# Patient Record
Sex: MALE | Race: WHITE | NOT HISPANIC OR LATINO | Employment: FULL TIME | URBAN - METROPOLITAN AREA
[De-identification: names, ages, dates, MRNs, and addresses within clinical notes are randomized per-mention and may not be internally consistent; named-entity substitution may affect disease eponyms.]

---

## 2021-10-07 ENCOUNTER — TELEPHONE (OUTPATIENT)
Dept: GASTROENTEROLOGY | Facility: CLINIC | Age: 56
End: 2021-10-07

## 2021-10-19 ENCOUNTER — TELEPHONE (OUTPATIENT)
Dept: GASTROENTEROLOGY | Facility: CLINIC | Age: 56
End: 2021-10-19

## 2021-10-20 ENCOUNTER — TELEPHONE (OUTPATIENT)
Dept: GASTROENTEROLOGY | Facility: CLINIC | Age: 56
End: 2021-10-20

## 2021-10-22 ENCOUNTER — TELEPHONE (OUTPATIENT)
Dept: GASTROENTEROLOGY | Facility: CLINIC | Age: 56
End: 2021-10-22

## 2021-11-30 PROCEDURE — 99358 PROLONG SERVICE W/O CONTACT: CPT | Mod: S$GLB,,, | Performed by: INTERNAL MEDICINE

## 2021-11-30 PROCEDURE — 99358 PR PROLONGED SERV,NO CONTACT,1ST HR: ICD-10-PCS | Mod: S$GLB,,, | Performed by: INTERNAL MEDICINE

## 2021-12-01 ENCOUNTER — OFFICE VISIT (OUTPATIENT)
Dept: GASTROENTEROLOGY | Facility: CLINIC | Age: 56
End: 2021-12-01
Payer: COMMERCIAL

## 2021-12-01 VITALS
WEIGHT: 185.88 LBS | HEART RATE: 91 BPM | DIASTOLIC BLOOD PRESSURE: 74 MMHG | OXYGEN SATURATION: 98 % | TEMPERATURE: 97 F | SYSTOLIC BLOOD PRESSURE: 111 MMHG | BODY MASS INDEX: 25.92 KG/M2

## 2021-12-01 DIAGNOSIS — Z79.891 CHRONIC PRESCRIPTION OPIATE USE: ICD-10-CM

## 2021-12-01 DIAGNOSIS — K50.80 CROHN'S DISEASE OF BOTH SMALL AND LARGE INTESTINE WITHOUT COMPLICATION: ICD-10-CM

## 2021-12-01 PROCEDURE — 99205 OFFICE O/P NEW HI 60 MIN: CPT | Mod: S$GLB,,, | Performed by: INTERNAL MEDICINE

## 2021-12-01 PROCEDURE — 99205 PR OFFICE/OUTPT VISIT, NEW, LEVL V, 60-74 MIN: ICD-10-PCS | Mod: S$GLB,,, | Performed by: INTERNAL MEDICINE

## 2021-12-01 RX ORDER — INSULIN DETEMIR 100 [IU]/ML
INJECTION, SOLUTION SUBCUTANEOUS
COMMUNITY
Start: 2021-08-24

## 2021-12-01 RX ORDER — CEPHALEXIN 500 MG/1
500 CAPSULE ORAL 3 TIMES DAILY
COMMUNITY
Start: 2021-11-23 | End: 2022-06-22

## 2021-12-01 RX ORDER — DOXYCYCLINE HYCLATE 100 MG
100 TABLET ORAL 2 TIMES DAILY
COMMUNITY
Start: 2021-11-22 | End: 2022-06-22

## 2021-12-01 RX ORDER — EMPAGLIFLOZIN 25 MG/1
25 TABLET, FILM COATED ORAL DAILY
COMMUNITY
Start: 2021-11-12

## 2021-12-01 RX ORDER — PREDNISONE 20 MG/1
20 TABLET ORAL DAILY
COMMUNITY
Start: 2021-09-21 | End: 2022-06-22

## 2021-12-01 RX ORDER — METFORMIN HYDROCHLORIDE 1000 MG/1
1000 TABLET ORAL 2 TIMES DAILY
COMMUNITY
Start: 2021-10-23

## 2021-12-01 RX ORDER — USTEKINUMAB 90 MG/ML
INJECTION, SOLUTION SUBCUTANEOUS
COMMUNITY
Start: 2021-11-05

## 2021-12-01 RX ORDER — INSULIN LISPRO 100 [IU]/ML
INJECTION, SOLUTION INTRAVENOUS; SUBCUTANEOUS
COMMUNITY
Start: 2021-06-11

## 2021-12-01 RX ORDER — ZOLPIDEM TARTRATE 5 MG/1
5 TABLET ORAL NIGHTLY PRN
COMMUNITY
Start: 2021-08-25 | End: 2022-06-22

## 2021-12-01 RX ORDER — SIMVASTATIN 20 MG/1
20 TABLET, FILM COATED ORAL
COMMUNITY
End: 2021-12-01

## 2021-12-06 ENCOUNTER — TELEPHONE (OUTPATIENT)
Dept: GASTROENTEROLOGY | Facility: CLINIC | Age: 56
End: 2021-12-06
Payer: COMMERCIAL

## 2022-03-10 ENCOUNTER — TELEPHONE (OUTPATIENT)
Dept: GASTROENTEROLOGY | Facility: CLINIC | Age: 57
End: 2022-03-10
Payer: COMMERCIAL

## 2022-03-10 NOTE — TELEPHONE ENCOUNTER
----- Message from Gonzalo Pascual sent at 3/10/2022 11:22 AM CST -----  Contact: @[phone]   Patient returning a missed call from  deepika Chambers return call

## 2022-03-10 NOTE — TELEPHONE ENCOUNTER
----- Message from Radha Anand sent at 3/8/2022  1:35 PM CST -----  Regarding: Call back  Contact: [phone]  Type:  Patient Call Back    Who Called:pt  What this is regarding?:was results received   Would the patient rather a call back or a response via [facility]? Call back  Best Call Back Number:[phone]  Additional Information:     Advised to call back directly if there are further questions, or if these symptoms fail to improve as anticipated or worsen.

## 2022-03-18 ENCOUNTER — TELEPHONE (OUTPATIENT)
Dept: GASTROENTEROLOGY | Facility: CLINIC | Age: 57
End: 2022-03-18
Payer: COMMERCIAL

## 2022-03-18 NOTE — TELEPHONE ENCOUNTER
Spoke with patient  Calling to see if we got the stool calpro results from Dr. Lovell  Informed we did not and we confirmed the fax number as [phone]  He is going to request they send them again    He wasn't sure what to do about his appointment on 4/1/22 with Dr. Sawyer and I advised we should wait until we get the stool calpro results before deciding.  Pt verbalized understanding to all and has no further questions at this time.

## 2022-03-18 NOTE — TELEPHONE ENCOUNTER
----- Message from Claudia Muse sent at 3/18/2022  2:04 PM CDT -----  Regarding: pt  Pt is calling to speak with the nurse in ref to his test results and rescheduling his April appt to a later date can you please call pt at [phone].    KAMILLA

## 2022-03-22 ENCOUNTER — PATIENT MESSAGE (OUTPATIENT)
Dept: GASTROENTEROLOGY | Facility: CLINIC | Age: 57
End: 2022-03-22
Payer: COMMERCIAL

## 2022-03-22 ENCOUNTER — TELEPHONE (OUTPATIENT)
Dept: GASTROENTEROLOGY | Facility: CLINIC | Age: 57
End: 2022-03-22
Payer: COMMERCIAL

## 2022-03-22 NOTE — TELEPHONE ENCOUNTER
[facility]    Collected:02/03/22    Stool culture    Negative  Fecal fat         Negative  Fecal leukocytes  Negative  Ova, Cysts, Parasites   Negative  Fecal taj       695ug/g  pancreativc elastase, fecal     >800  Giardia by EIA    Negative  Occult blood      Negative  Rotavirus      negative

## 2022-04-22 ENCOUNTER — TELEPHONE (OUTPATIENT)
Dept: GASTROENTEROLOGY | Facility: CLINIC | Age: 57
End: 2022-04-22
Payer: COMMERCIAL

## 2022-05-06 NOTE — TELEPHONE ENCOUNTER
Spoke with patient and informed him that his appointment with Dr. Sawyer on May 13, 2022 at 3:30 pm has now been rescheduled to a virtual appointment on 5/16/2022 at 11:30 am.  He accepted the appointment.

## 2022-05-13 ENCOUNTER — TELEPHONE (OUTPATIENT)
Dept: GASTROENTEROLOGY | Facility: CLINIC | Age: 57
End: 2022-05-13
Payer: COMMERCIAL

## 2022-06-22 RX ORDER — FERROUS SULFATE 325(65) MG
TABLET ORAL
COMMUNITY
Start: 2022-05-16

## 2022-06-23 NOTE — PROGRESS NOTES
IBD PATIENT INTAKE:    COVID symptoms in the last 7 days (runny nose, sore throat, congestion, cough, fever): No  PCP: Edie Holley  If not PCP-  number given to establish [phone]: N/A    ALLERGIES REVIEWED:  Yes    CHIEF COMPLAINT:  No chief complaint on file.      VITAL SIGNS:  There were no vitals taken for this visit.     Change in medical, surgical, family or social history: No    IBD THERAPY (name, dose/frequency):  stelara 90mg q 4 weeks, methotrexate 15mg qd  Last dose:  06/15/22    Next dose:  08/10/22  Infusion/Pharmacy: [place]    NSAIDs (aspirin, ibuprofen-advil or motrin, naproxen-aleve, diclofenac-voltaren, BC powder, excedrin, goodies): Yes    Alternative/Complementary Medications (i.e. probiotics, turmeric, fish oil, aloe vera):      yes  Name/dose:  Fish oil    Vitamins:   Vit D:  no     Vit B-12:  qd   Folic Acid: 1mg qd       Calcium: no     Iron:  65mg qd      MVI: qd    Antibiotics (past 30 Days):  no  If yes   Indication:  Name of antibiotic:  Completion date:     REVIEWED MEDICATION LIST RECONCILED INCLUDING ABOVE MEDS:  yes

## 2022-06-24 ENCOUNTER — OFFICE VISIT (OUTPATIENT)
Dept: GASTROENTEROLOGY | Facility: CLINIC | Age: 57
End: 2022-06-24
Payer: COMMERCIAL

## 2022-06-24 DIAGNOSIS — K50.80 CROHN'S DISEASE OF BOTH SMALL AND LARGE INTESTINE WITHOUT COMPLICATION: Primary | ICD-10-CM

## 2022-06-24 DIAGNOSIS — Z79.891 CHRONIC PRESCRIPTION OPIATE USE: ICD-10-CM

## 2022-06-24 PROCEDURE — 1159F MED LIST DOCD IN RCRD: CPT | Mod: CPTII,95,, | Performed by: INTERNAL MEDICINE

## 2022-06-24 PROCEDURE — 4010F ACE/ARB THERAPY RXD/TAKEN: CPT | Mod: CPTII,95,, | Performed by: INTERNAL MEDICINE

## 2022-06-24 PROCEDURE — 4010F PR ACE/ARB THEARPY RXD/TAKEN: ICD-10-PCS | Mod: CPTII,95,, | Performed by: INTERNAL MEDICINE

## 2022-06-24 PROCEDURE — 1159F PR MEDICATION LIST DOCUMENTED IN MEDICAL RECORD: ICD-10-PCS | Mod: CPTII,95,, | Performed by: INTERNAL MEDICINE

## 2022-06-24 PROCEDURE — 99214 OFFICE O/P EST MOD 30 MIN: CPT | Mod: 95,,, | Performed by: INTERNAL MEDICINE

## 2022-06-24 PROCEDURE — 99214 PR OFFICE/OUTPT VISIT, EST, LEVL IV, 30-39 MIN: ICD-10-PCS | Mod: 95,,, | Performed by: INTERNAL MEDICINE

## 2022-06-24 PROCEDURE — 1160F PR REVIEW ALL MEDS BY PRESCRIBER/CLIN PHARMACIST DOCUMENTED: ICD-10-PCS | Mod: CPTII,95,, | Performed by: INTERNAL MEDICINE

## 2022-06-24 PROCEDURE — 1160F RVW MEDS BY RX/DR IN RCRD: CPT | Mod: CPTII,95,, | Performed by: INTERNAL MEDICINE

## 2022-06-24 RX ORDER — FOLIC ACID 1 MG/1
1000 TABLET ORAL DAILY
COMMUNITY
Start: 2022-06-01

## 2022-06-24 RX ORDER — METHOTREXATE 2.5 MG/1
15 TABLET ORAL
COMMUNITY
Start: 2022-06-11

## 2022-06-24 NOTE — PROGRESS NOTES
[facility]          Inflammatory Bowel Disease Follow Up Note         TODAY'S VISIT DATE:  6/24/2022    Reason for Consult:    Chief Complaint   Patient presents with    Crohn's Disease       PCP: Edie Holley      Referring MD:   No ref. provider found    History of Present Illness:  Tyler Ordoñez III who is a 56 y.o. male is being seen today at the [facility] on 06/24/2022 for inflammatory bowel disease- Crohn's disease.  After our initial visit he started methotrexate and was able to wean off of prednisone completely.  He has been off of this since mid April.  Between December and April he continued to have significant symptoms but around mid April his symptoms started to improve significantly.  He has continued to do well until this point.  He is no longer seen any blood in his stools.  His bowel movements are essentially normal.  He has had some loose stools this week but he was taking some MiraLax to prevent constipation last week.  He still has some abdominal discomfort but mostly this is in the evening.  He has been able to decrease his Norco use to only in the evenings on Friday, Saturday, and Sunday he is taking Stelara every month, methotrexate 15 mg weekly, and folic acid 1 mg daily.  He also recently started iron supplements and B12 supplements and feels like this has helped with his energy level.    IBD History:  In March of 2013 he received antibiotics for a tooth infection.  He was treated with amoxicillin at that time.  Several weeks later he developed severe abdominal pain with cramping and diarrhea.  He was empirically treated for C diff colitis with metronidazole but there was no improvement.  He underwent a colonoscopy that showed patchy colitis in the rectum and sigmoid colon but a normal terminal ileum.  Biopsy showed cryptitis but were otherwise unremarkable.  He was initially started on budesonide but  had a severe allergic reaction with swelling of his mouth and lips so this was discontinued.  He was then treated with Rowasa enemas for a short period of time as well as a probiotic.  His diarrhea completely resolved.  He did well until early 2014 when he began having abdominal cramping and diarrhea again.  He also had some hematochezia at that time.  A colonoscopy was done which showed rectal sparing but severe colitis involving the sigmoid colon and progressing proximally.  He also had some ileitis as well and it was felt that this picture was consistent with Crohn's disease.  He was started on prednisone 40 milligrams daily and metronidazole.  He was also started on azathioprine.  In April of 2014 he was admitted to the hospital with severe diarrhea and found to be positive for C diff colitis.  He was treated with 2 weeks of vancomycin and his diarrhea resolved.  Shortly after this hospitalization he was hospitalized again and diagnosed with pancreatitis that was felt to be related to azathioprine.  He was subsequently started on infliximab.  Shortly after starting infliximab he developed hives while receiving infliximab.  This required treatment with Benadryl.  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  Infliximab was stopped and he was started on adalimumab in June 2014. In spite of adalimumab he continued to have ongoing symptoms.  Repeat testing for C diff was negative.  He was started on higher dose of prednisone and had some improvement in his diarrhea and abdominal cramping.  A repeat colonoscopy October 31, 2014 showed continued ulceration of the rectosigmoid colon.  A capsule endoscopy was also performed that showed no small intestinal lesions.  In February of 2015 he had a normal small bowel series.  He was started on vedolizumab in December of 2014 along with methotrexate 25 milligrams weekly subcutaneous injections.  In April of 2015 he was seen at our  clinic by Dr. Fried.  A subsequent colonoscopy done May 1, 2015 showed multiple small ulcers throughout the entire colon again.  He also had 2 small colon polyps as well.  The pathology from this procedure was unavailable.  He continued to do fairly well from a symptom standpoint on vedolizumab and methotrexate.  A repeat colonoscopy was done on June 14, 2018 and was normal appearing.  Biopsies from the terminal ileum were normal as well.  A repeat small-bowel follow-through in August 2018 was also normal.  He complained of abdominal cramping as well as chronic constipation issues.  He was taking Norco daily due to chronic abdominal pain.  Methotrexate was switched to 15 milligrams weekly taken orally as opposed to injected in 2018. His constipation was managed with Linzess 145 micrograms daily but he continued to take Norco every night.  He was noted to have some symptoms about 1-2 weeks before his next infusions so vedolizumab levels were checked in 2019 and were noted to be 11 with no antibodies present.  In early 2020 his Entyvio infusions were increased to every 6 week intervals and he was feeling much better.  He continued to take methotrexate 15 milligrams weekly and folic acid.  A repeat vedolizumab level was 24 with no antibodies present January 13, 2021. At that time he also had a negative TB test and negative hepatitis-B serologies.  A CRP was also undetectable.  Due to an increase in hematochezia in early 2021 he had a repeat colonoscopy in March 2021. At that time he was found to have mild diverticulosis throughout the entire colon as well as the diffuse patch of abnormal mucosa in the rectum and the sigmoid colon characterized by erythema, edema, and ulceration.  Biopsy showed chronic moderately active colitis again.  At that time his vedolizumab level was 31. He started Stelara in January 2021 and was initially feeling well but by August of 2021 he was having an increase in symptoms again.  Stool  studies were repeated and were negative for infection but a fecal calprotectin level was markedly elevated at greater than 3000. A repeat colonoscopy in September 2021 so showed diffuse inflammation and ulceration throughout the rectum and friable mucosa throughout the rest of the colon.  Biopsies again showed chronic active proctitis.  A CT abdomen pelvis done September 9, 2021 showed findings consistent with descending and sigmoid colitis including thickening and fat stranding.  He was eventually started on prednisone 40 milligrams daily as well.  A Stelara level was ordered for September but never was collected.  The decision was made to increase his dose to every 4 weeks at the end of September.  He was referred to the IBD the clinic for further evaluation.  A recent Stelara level was 4.3 with no antibodies present.  After our initial visit he started methotrexate in addition to Stelara every month.  He was able to wean off of prednisone.  A follow-up stool calprotectin in February had declined significantly to 695.    IBD Details:  Dx Date:  2013  Disease type/distribution:  Crohn's disease/rectum, sigmoid, descending colon recently but previous colonoscopy with evidence of active disease into the ascending colon and transverse colon; mild ileitis noted in 1 colonoscopy  Current Treatment:  Stelara 90 mg every 4 weeks/methotrexate 15 mg weekly  Start Date:  January 2021/December 2021 Response:  Incomplete  Optimized:  Yes  Adverse reactions:  None  Prior surgeries:  None  CRP Elevation: Y  calprotectin:  Elevated with active disease  Disease Complications:  None  Extraintestinal manifestations:  None  Prior treatments:   Steroids:  Good response to prednisone-significant side effects; budesonide caused hives years ago but he has used recently without any issues  5ASA:  None  IMM:  Pancreatitis caused by azathioprine, no issues with methotrexate  TNF Inh:  Remicade-only received 1 or 2 doses before having an  adverse reaction (reports of hives and a syncopal episode)    Adalimumab-took for 6 months, no drug levels checked, no adverse reactions, ineffective, dose never increased   Anti-Integrin:  Secondary nonresponder to optimized Entyvio dosing   IL 12/23:  Currently on Stelara every 4 weeks with a level of 4.3  BRAN Inh:  None    Previous Clinical Trials:  None    Last Colonoscopy:  September 2021-active disease in the rectum, sigmoid, descending colon    Other Endoscopies:  Previous upper endoscopy reviewed    Imaging:   MRE:  None   CT:  Inflammatory changes in the sigmoid and descending colon in September 2021   Other:  Multiple small-bowel follow-through was reviewed    Pertinent Labs:  Lab Results   Component Value Date    CRP 4.6 04/29/2015     No results found for: TTGIGA, IGA  No results found for: TSH, FREET4  Lab Results   Component Value Date    [id number] 62 04/29/2015    [id number] 824 04/29/2015     No results found for: HEPBSAG, HEPBCAB, HEPCAB  No results found for: BIB81XARA  No results found for: NIL, TBAG, TBAGNIL, MITOGENNIL, TBGOLD, TSPOTSCREN  No results found for: TPTMINTERP, TPMTRESULT  No results found for: STOOLCULTURE, RXEZBUTQWF6X, EMRLVMKAHP9A, CDIFFICILEAN, CDIFFTOX, CDIFFICILEBY  No results found for: CALPROTECTIN    Therapeutic Drug Monitoring Labs:  No results found for: PROMETH  No results found for: ANSADAINIT, INFLIXIMAB, INFLIXINTERP    Vaccinations:  No results found for: HEPBSAB  No results found for: HEPAIGG  No results found for: VARICELLAZOS, VARICELLAINT  Immunization History   Administered Date(s) Administered    Influenza 09/15/2021         Review of Systems  Review of Systems   Constitutional: Negative for chills, fever and weight loss.   HENT: Negative for sore throat.    Eyes: Negative for pain, discharge and redness.   Respiratory: Negative for cough, shortness of breath and wheezing.    Cardiovascular: Negative for chest pain and leg swelling.   Gastrointestinal:  Negative for abdominal pain, blood in stool, constipation, diarrhea, heartburn, melena, nausea and vomiting.   Genitourinary: Negative for dysuria and frequency.   Musculoskeletal: Negative for back pain, joint pain and myalgias.   Skin: Negative for rash.   Neurological: Negative for focal weakness and seizures.   Endo/Heme/Allergies: Does not bruise/bleed easily.   Psychiatric/Behavioral: Negative for depression. The patient is not nervous/anxious.        Medical History:   Past Medical History:   Diagnosis Date    Crohn's disease     UC on IBD serology    DM2 (diabetes mellitus, type 2)     Thyroiditis        Surgical History:  Past Surgical History:   Procedure Laterality Date    UNDESCENDED TESTICLE EXPLORATION         Family History:   Family History   Problem Relation Age of Onset    Inflammatory bowel disease Father         possibly    Colon cancer Neg Hx     Celiac disease Neg Hx        Social History:   Social History     Tobacco Use    Smoking status: Never Smoker    Smokeless tobacco: Never Used       Allergies: Reviewed    Home Medications:   Medication List with Changes/Refills   Current Medications    AMITRIPTYLINE (ELAVIL) 25 MG TABLET    Take 1 tablet (25 mg total) by mouth every evening. Take 1/2 tablet each night first week, then increase to 1 tablet each night    ASPIRIN (ECOTRIN) 81 MG EC TABLET    Take 81 mg by mouth once daily.    FERROUS SULFATE (FEOSOL) 325 MG (65 MG IRON) TAB TABLET        FISH OIL-OMEGA-3 FATTY ACIDS 300-1,000 MG CAPSULE    Take 2 g by mouth once daily.    FOLIC ACID (FOLVITE) 1 MG TABLET    Take 1,000 mcg by mouth once daily.    HUMALOG KWIKPEN INSULIN 100 UNIT/ML PEN    Inject into the skin.    HYDROCODONE-ACETAMINOPHEN 7.5-325MG (NORCO) 7.5-325 MG PER TABLET    Take 1 tablet by mouth daily as needed for Pain.    INSULIN ASPART (NOVOLOG) 100 UNIT/ML INJECTION    Inject into the skin 3 (three) times daily before meals.    JARDIANCE 25 MG TABLET    Take 25 mg by  mouth once daily.    LEVEMIR FLEXTOUCH U-100 INSULN 100 UNIT/ML (3 ML) INPN PEN    Inject into the skin.    LOSARTAN POTASSIUM (LOSARTAN ORAL)        MECOBALAMIN (B12 ACTIVE ORAL)    once daily at 6am.    MEN'S MULTI-VITAMIN ORAL    Take by mouth.    METFORMIN (GLUCOPHAGE) 1000 MG TABLET    Take 1,000 mg by mouth 2 (two) times daily.    METHOTREXATE 2.5 MG TAB    Take 15 mg by mouth every 7 days.    ONDANSETRON (ZOFRAN) 8 MG TABLET    Take 8 mg by mouth every 8 (eight) hours.    SIMVASTATIN (ZOCOR) 40 MG TABLET    Take 40 mg by mouth every evening.    STELARA 90 MG/ML SYRG SYRINGE    Inject into the skin every 28 days.   Discontinued Medications    CEPHALEXIN (KEFLEX) 500 MG CAPSULE    Take 500 mg by mouth 3 (three) times daily.    DOXYCYCLINE (VIBRA-TABS) 100 MG TABLET    Take 100 mg by mouth 2 (two) times daily.    INSULIN DETEMIR (LEVEMIR FLEXPEN SUBQ)    Inject 15 Units into the skin.    MULTIVITAMIN CAPSULE    Take 1 capsule by mouth once daily.    PREDNISONE (DELTASONE) 20 MG TABLET    Take 20 mg by mouth once daily.    ZOLPIDEM (AMBIEN) 5 MG TAB    Take 5 mg by mouth nightly as needed.       Physical Exam:  Vital Signs:  There were no vitals taken for this visit.  There is no height or weight on file to calculate BMI.    Physical Exam  Nursing note reviewed.   Constitutional:       General: He is not in acute distress.     Appearance: Normal appearance. He is well-developed. He is not ill-appearing or toxic-appearing.   Skin:     Findings: No rash.   Neurological:      General: No focal deficit present.      Mental Status: He is alert and oriented to person, place, and time.   Psychiatric:         Mood and Affect: Mood normal.         Behavior: Behavior normal.         Thought Content: Thought content normal.         Judgment: Judgment normal.         Labs: reviewed and pertinent noted above    Assessment/Plan:  Tyler Ordoñez III is a 56 y.o. male with longstanding complicated Crohn's disease that has  been fairly refractory to most medical therapies. The following issues were addresssed:    1. Crohn's disease of both small and large intestine without complication    2. Chronic prescription opiate use      1. Crohn's disease:  He is doing significantly better right now.  Today I recommend that we check and see if he has had a recent Stelara level checked.  If so and the level is adequate that I would recommend continuing at his current dose along with the methotrexate.  I did encourage him to increase the Stelara today every 4 weeks instead of taking it once a month as this may help increase the level slightly as well.  Once we have a Stelara level optimized we will plan to check a stool calprotectin level to see if this is continuing to improve.  If that looks good then we will plan to proceed with a colonoscopy in the next couple of months.    2. Chronic opiate use:  Continue limiting opioid use     Ex smoker:  - recommended to continue smoking cessation  - discussed in detail that Crohn's disease can worsen with smoking and may make patient more resistant to treatment    # IBD specific health maintenance:  Colon cancer surveillance:  Up-to-date    Annual:  - Eye exam:  Not applicable  - Skin exam (if on IMM/TNF):  Discuss in the future  - reminded pt to use sunblock/hats/sunprotective clothing  - PAP (if immunosuppressed):  Not applicable    DEXA:  Will need a bone scan in the near future if not done recently.    Vitamin D:  Per primary gastroenterologist    Vaccines:    Influenza:  Recommend annual vaccination   COVID:  Completed series and booster    Follow up: Follow up in about 4 months (around 10/24/2022).    Thank you again for sending Tyler Ordoñez III to see Dr. Timbo Sawyer today at the [facility]. Please don't hesitate to contact Dr. Sawyer if there are any questions regarding this evaluation, or if you have any other patients with inflammatory bowel disease for  whom you would like a consultation. You can reach Dr. Sawyer at [phone] or by email at [email or web address]    Brannon Sawyer MD  Department of Gastroenterology  Inflammatory Bowel Disease

## 2022-10-26 ENCOUNTER — TELEPHONE (OUTPATIENT)
Dept: GASTROENTEROLOGY | Facility: CLINIC | Age: 57
End: 2022-10-26
Payer: COMMERCIAL

## 2022-10-26 ENCOUNTER — OFFICE VISIT (OUTPATIENT)
Dept: GASTROENTEROLOGY | Facility: CLINIC | Age: 57
End: 2022-10-26
Payer: COMMERCIAL

## 2022-10-26 DIAGNOSIS — K50.80 CROHN'S DISEASE OF BOTH SMALL AND LARGE INTESTINE WITHOUT COMPLICATION: Primary | ICD-10-CM

## 2022-10-26 PROCEDURE — 4010F ACE/ARB THERAPY RXD/TAKEN: CPT | Mod: CPTII,95,, | Performed by: INTERNAL MEDICINE

## 2022-10-26 PROCEDURE — 1159F MED LIST DOCD IN RCRD: CPT | Mod: CPTII,95,, | Performed by: INTERNAL MEDICINE

## 2022-10-26 PROCEDURE — 1160F RVW MEDS BY RX/DR IN RCRD: CPT | Mod: CPTII,95,, | Performed by: INTERNAL MEDICINE

## 2022-10-26 PROCEDURE — 99214 PR OFFICE/OUTPT VISIT, EST, LEVL IV, 30-39 MIN: ICD-10-PCS | Mod: 95,,, | Performed by: INTERNAL MEDICINE

## 2022-10-26 PROCEDURE — 1160F PR REVIEW ALL MEDS BY PRESCRIBER/CLIN PHARMACIST DOCUMENTED: ICD-10-PCS | Mod: CPTII,95,, | Performed by: INTERNAL MEDICINE

## 2022-10-26 PROCEDURE — 99214 OFFICE O/P EST MOD 30 MIN: CPT | Mod: 95,,, | Performed by: INTERNAL MEDICINE

## 2022-10-26 PROCEDURE — 4010F PR ACE/ARB THEARPY RXD/TAKEN: ICD-10-PCS | Mod: CPTII,95,, | Performed by: INTERNAL MEDICINE

## 2022-10-26 PROCEDURE — 1159F PR MEDICATION LIST DOCUMENTED IN MEDICAL RECORD: ICD-10-PCS | Mod: CPTII,95,, | Performed by: INTERNAL MEDICINE

## 2022-10-26 RX ORDER — LOSARTAN POTASSIUM 25 MG/1
25 TABLET ORAL DAILY
COMMUNITY
Start: 2022-10-17

## 2022-10-26 NOTE — TELEPHONE ENCOUNTER
Mxbx full Hyun Dr. Lovell nurse o#[phone] f#[phone]. Last stelara level needed. Faxed stool taj order Dr. Lovell per pt request. Emailed stool req to emailed on file. Reminder set to f/u

## 2022-10-26 NOTE — PROGRESS NOTES
[facility]          Inflammatory Bowel Disease Follow Up Note         TODAY'S VISIT DATE:  10/26/2022    Reason for Consult:    Chief Complaint   Patient presents with    Crohn's Disease       PCP: No primary care provider on file.      Referring MD:   Dr. Osiel Lovell    History of Present Illness:  Tyler Ordoñez III who is a 56 y.o. male is being seen today at the [facility] on 10/26/2022 for inflammatory bowel disease- Crohn's disease.  He reports that he has been doing very well.  Since our last visit he has not had any issues with diarrhea or blood in the stools.  He has had no problems with Stelara or methotrexate.  He continues to take folic acid along with the methotrexate.  He has noticed a slight increase in abdominal pain about 5 days before his next Stelara injection.  He has been off of prednisone since March.  He has some mild nausea in the morning for which he takes Zofran.  He still uses Norco occasionally but it limits this to only on Friday Saturday and Sunday.  He had a Stelara level checked a few months ago but I do not have the results of this he saw Dr. Lovell recently and they discussed a colonoscopy but he decided to put this off for a short period of time.    IBD History:  In March of 2013 he received antibiotics for a tooth infection.  He was treated with amoxicillin at that time.  Several weeks later he developed severe abdominal pain with cramping and diarrhea.  He was empirically treated for C diff colitis with metronidazole but there was no improvement.  He underwent a colonoscopy that showed patchy colitis in the rectum and sigmoid colon but a normal terminal ileum.  Biopsy showed cryptitis but were otherwise unremarkable.  He was initially started on budesonide but had a severe allergic reaction with swelling of his mouth and lips so this was discontinued.  He was then treated with Rowasa enemas for a  short period of time as well as a probiotic.  His diarrhea completely resolved.  He did well until early 2014 when he began having abdominal cramping and diarrhea again.  He also had some hematochezia at that time.  A colonoscopy was done which showed rectal sparing but severe colitis involving the sigmoid colon and progressing proximally.  He also had some ileitis as well and it was felt that this picture was consistent with Crohn's disease.  He was started on prednisone 40 milligrams daily and metronidazole.  He was also started on azathioprine.  In April of 2014 he was admitted to the hospital with severe diarrhea and found to be positive for C diff colitis.  He was treated with 2 weeks of vancomycin and his diarrhea resolved.  Shortly after this hospitalization he was hospitalized again and diagnosed with pancreatitis that was felt to be related to azathioprine.  He was subsequently started on infliximab.  Shortly after starting infliximab he developed hives while receiving infliximab.  This required treatment with Benadryl.  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  Infliximab was stopped and he was started on adalimumab in June 2014. In spite of adalimumab he continued to have ongoing symptoms.  Repeat testing for C diff was negative.  He was started on higher dose of prednisone and had some improvement in his diarrhea and abdominal cramping.  A repeat colonoscopy October 31, 2014 showed continued ulceration of the rectosigmoid colon.  A capsule endoscopy was also performed that showed no small intestinal lesions.  In February of 2015 he had a normal small bowel series.  He was started on vedolizumab in December of 2014 along with methotrexate 25 milligrams weekly subcutaneous injections.  In April of 2015 he was seen at our clinic by Dr. Fried.  A subsequent colonoscopy done May 1, 2015 showed multiple small ulcers throughout the entire colon again.  He also had 2  small colon polyps as well.  The pathology from this procedure was unavailable.  He continued to do fairly well from a symptom standpoint on vedolizumab and methotrexate.  A repeat colonoscopy was done on June 14, 2018 and was normal appearing.  Biopsies from the terminal ileum were normal as well.  A repeat small-bowel follow-through in August 2018 was also normal.  He complained of abdominal cramping as well as chronic constipation issues.  He was taking Norco daily due to chronic abdominal pain.  Methotrexate was switched to 15 milligrams weekly taken orally as opposed to injected in 2018. His constipation was managed with Linzess 145 micrograms daily but he continued to take Norco every night.  He was noted to have some symptoms about 1-2 weeks before his next infusions so vedolizumab levels were checked in 2019 and were noted to be 11 with no antibodies present.  In early 2020 his Entyvio infusions were increased to every 6 week intervals and he was feeling much better.  He continued to take methotrexate 15 milligrams weekly and folic acid.  A repeat vedolizumab level was 24 with no antibodies present January 13, 2021. At that time he also had a negative TB test and negative hepatitis-B serologies.  A CRP was also undetectable.  Due to an increase in hematochezia in early 2021 he had a repeat colonoscopy in March 2021. At that time he was found to have mild diverticulosis throughout the entire colon as well as the diffuse patch of abnormal mucosa in the rectum and the sigmoid colon characterized by erythema, edema, and ulceration.  Biopsy showed chronic moderately active colitis again.  At that time his vedolizumab level was 31. He started Stelara in January 2021 and was initially feeling well but by August of 2021 he was having an increase in symptoms again.  Stool studies were repeated and were negative for infection but a fecal calprotectin level was markedly elevated at greater than 3000. A repeat  colonoscopy in September 2021 so showed diffuse inflammation and ulceration throughout the rectum and friable mucosa throughout the rest of the colon.  Biopsies again showed chronic active proctitis.  A CT abdomen pelvis done September 9, 2021 showed findings consistent with descending and sigmoid colitis including thickening and fat stranding.  He was eventually started on prednisone 40 milligrams daily as well.  A Stelara level was ordered for September but never was collected.  The decision was made to increase his dose to every 4 weeks at the end of September.  He was referred to the IBD the clinic for further evaluation.  A recent Stelara level was 4.3 with no antibodies present.  After our initial visit he started methotrexate in addition to Stelara every month.  He was able to wean off of prednisone.  A follow-up stool calprotectin in February had declined significantly to 695.    IBD Details:  Dx Date:  2013  Disease type/distribution:  Crohn's disease/rectum, sigmoid, descending colon recently but previous colonoscopy with evidence of active disease into the ascending colon and transverse colon; mild ileitis noted in 1 colonoscopy  Current Treatment:  Stelara 90 mg every 4 weeks/methotrexate 15 mg weekly  Start Date:  January 2021/December 2021 Response:  Incomplete  Optimized:  Yes  Adverse reactions:  None  Prior surgeries:  None  CRP Elevation: Y  calprotectin:  Elevated with active disease  Disease Complications:  None  Extraintestinal manifestations:  None  Prior treatments:   Steroids:  Good response to prednisone-significant side effects; budesonide caused hives years ago but he has used recently without any issues  5ASA:  None  IMM:  Pancreatitis caused by azathioprine, no issues with methotrexate  TNF Inh:  Remicade-only received 1 or 2 doses before having an adverse reaction (reports of hives and a syncopal episode)    Adalimumab-took for 6 months, no drug levels checked, no adverse reactions,  ineffective, dose never increased   Anti-Integrin:  Secondary nonresponder to optimized Entyvio dosing   IL 12/23:  Currently on Stelara every 4 weeks with a level of 4.3  BRAN Inh:  None    Previous Clinical Trials:  None    Last Colonoscopy:  September 2021-active disease in the rectum, sigmoid, descending colon    Other Endoscopies:  Previous upper endoscopy reviewed    Imaging:   MRE:  None   CT:  Inflammatory changes in the sigmoid and descending colon in September 2021   Other:  Multiple small-bowel follow-through was reviewed    Pertinent Labs:  Lab Results   Component Value Date    CRP 4.6 04/29/2015     No results found for: TTGIGA, IGA  No results found for: TSH, FREET4  Lab Results   Component Value Date    RQAAZOUD07GK 62 04/29/2015    IKFRVTYR16 824 04/29/2015     No results found for: HEPBSAG, HEPBCAB, HEPCAB  No results found for: ETU08FMJZ  No results found for: NIL, TBAG, TBAGNIL, MITOGENNIL, TBGOLD, TSPOTSCREN  No results found for: TPTMINTERP, TPMTRESULT  No results found for: STOOLCULTURE, HIBBMMIFHN2X, WYLVJXYKAP1O, CDIFFICILEAN, CDIFFTOX, CDIFFICILEBY  No results found for: CALPROTECTIN    Therapeutic Drug Monitoring Labs:  No results found for: PROMETH  No results found for: ANSADAINIT, INFLIXIMAB, INFLIXINTERP    Vaccinations:  No results found for: HEPBSAB  No results found for: HEPAIGG  No results found for: VARICELLAZOS, VARICELLAINT  Immunization History   Administered Date(s) Administered    Influenza 09/15/2021         Review of Systems  Review of Systems   Constitutional:  Negative for chills, fever and weight loss.   HENT:  Negative for sore throat.    Eyes:  Negative for pain, discharge and redness.   Respiratory:  Negative for cough, shortness of breath and wheezing.    Cardiovascular:  Negative for chest pain and leg swelling.   Gastrointestinal:  Positive for abdominal pain and nausea. Negative for blood in stool, constipation, diarrhea, heartburn, melena and vomiting.    Genitourinary:  Negative for dysuria and frequency.   Musculoskeletal:  Negative for back pain, joint pain and myalgias.   Skin:  Negative for rash.   Neurological:  Negative for focal weakness and seizures.   Endo/Heme/Allergies:  Does not bruise/bleed easily.   Psychiatric/Behavioral:  Negative for depression. The patient is nervous/anxious.      Medical History:   Past Medical History:   Diagnosis Date    Crohn's disease     UC on IBD serology    DM2 (diabetes mellitus, type 2)     Thyroiditis        Surgical History:  Past Surgical History:   Procedure Laterality Date    UNDESCENDED TESTICLE EXPLORATION         Family History:   Family History   Problem Relation Age of Onset    Inflammatory bowel disease Father         possibly    Colon cancer Neg Hx     Celiac disease Neg Hx        Social History:   Social History     Tobacco Use    Smoking status: Never    Smokeless tobacco: Never   Substance Use Topics    Drug use: Never       Allergies: Reviewed    Home Medications:   Medication List with Changes/Refills   Current Medications    AMITRIPTYLINE (ELAVIL) 25 MG TABLET    Take 1 tablet (25 mg total) by mouth every evening. Take 1/2 tablet each night first week, then increase to 1 tablet each night    ASPIRIN (ECOTRIN) 81 MG EC TABLET    Take 81 mg by mouth once daily.    FERROUS SULFATE (FEOSOL) 325 MG (65 MG IRON) TAB TABLET        FISH OIL-OMEGA-3 FATTY ACIDS 300-1,000 MG CAPSULE    Take 2 g by mouth once daily.    FOLIC ACID (FOLVITE) 1 MG TABLET    Take 1,000 mcg by mouth once daily.    HUMALOG KWIKPEN INSULIN 100 UNIT/ML PEN    Inject into the skin.    HYDROCODONE-ACETAMINOPHEN 7.5-325MG (NORCO) 7.5-325 MG PER TABLET    Take 1 tablet by mouth daily as needed for Pain.    INSULIN ASPART (NOVOLOG) 100 UNIT/ML INJECTION    Inject into the skin 3 (three) times daily before meals.    JARDIANCE 25 MG TABLET    Take 25 mg by mouth once daily.    LEVEMIR FLEXTOUCH U-100 INSULN 100 UNIT/ML (3 ML) INPN PEN    Inject  into the skin.    LOSARTAN (COZAAR) 25 MG TABLET    Take 25 mg by mouth once daily.    MECOBALAMIN (B12 ACTIVE ORAL)    once daily at 6am.    MEN'S MULTI-VITAMIN ORAL    Take by mouth.    METFORMIN (GLUCOPHAGE) 1000 MG TABLET    Take 1,000 mg by mouth 2 (two) times daily.    METHOTREXATE 2.5 MG TAB    Take 15 mg by mouth every 7 days.    ONDANSETRON (ZOFRAN) 8 MG TABLET    Take 8 mg by mouth every 8 (eight) hours.    SIMVASTATIN (ZOCOR) 40 MG TABLET    Take 40 mg by mouth every evening.    STELARA 90 MG/ML SYRG SYRINGE    Inject into the skin every 28 days.   Discontinued Medications    LOSARTAN POTASSIUM (LOSARTAN ORAL)           Physical Exam:  Vital Signs:  There were no vitals taken for this visit.  There is no height or weight on file to calculate BMI.    Physical Exam  Nursing note reviewed.   Constitutional:       General: He is not in acute distress.     Appearance: Normal appearance. He is well-developed. He is not ill-appearing or toxic-appearing.   Skin:     Findings: No rash.   Neurological:      General: No focal deficit present.      Mental Status: He is alert and oriented to person, place, and time.   Psychiatric:         Mood and Affect: Mood normal.         Behavior: Behavior normal.         Thought Content: Thought content normal.         Judgment: Judgment normal.       Labs: reviewed and pertinent noted above    Assessment/Plan:  Tyler Ordoñez III is a 56 y.o. male with longstanding complicated Crohn's disease that has been fairly refractory to most medical therapies. The following issues were addresssed:    1. Crohn's disease of both small and large intestine without complication      1. Crohn's disease:  He is doing very well.  This is the best he has done quite some time.  Today I recommend that he continue his current medical therapy.  He reports that he had labs done recently through his local gastroenterologist for monitoring with the methotrexate.  We will reach out to his doctor's  office and try to get the recent Stelara level.  We discussed that he will need a colonoscopy at some point in near future but at this point I would recommend that we at least check a stool calprotectin level to see if his inflammatory burden has improved.    2. Chronic opiate use:  Continue limiting opioid use     Ex smoker:  - recommended to continue smoking cessation  - discussed in detail that Crohn's disease can worsen with smoking and may make patient more resistant to treatment    # IBD specific health maintenance:  Colon cancer surveillance:  Up-to-date    Annual:  - Eye exam:  Not applicable  - Skin exam (if on IMM/TNF):  Discuss in the future  - reminded pt to use sunblock/hats/sunprotective clothing  - PAP (if immunosuppressed):  Not applicable    DEXA:  Will need a bone scan in the near future if not done recently.    Vitamin D:  Per primary gastroenterologist    Vaccines:    Influenza:  Recommend annual vaccination   COVID:  Completed series and booster    Follow up: Follow up in about 6 months (around 4/26/2023).    Thank you again for sending Tyler Ordoñez III to see Dr. Timbo Sawyer today at the [facility]. Please don't hesitate to contact Dr. Sawyer if there are any questions regarding this evaluation, or if you have any other patients with inflammatory bowel disease for whom you would like a consultation. You can reach Dr. Sawyer at [phone] or by email at [email or web address]    Brannon Sawyer MD  Department of Gastroenterology  Inflammatory Bowel Disease    The patient location is: [place]  The chief complaint leading to consultation is: Crohn's disease    Visit type: audiovisual    Face to Face time with patient: 20  30 minutes of total time spent on the encounter, which includes face to face time and non-face to face time preparing to see the patient (eg, review of tests), Obtaining and/or reviewing separately obtained history, Documenting clinical  information in the electronic or other health record, Independently interpreting results (not separately reported) and communicating results to the patient/family/caregiver, or Care coordination (not separately reported).         Each patient to whom he or she provides medical services by telemedicine is:  (1) informed of the relationship between the physician and patient and the respective role of any other health care provider with respect to management of the patient; and (2) notified that he or she may decline to receive medical services by telemedicine and may withdraw from such care at any time.    Notes:

## 2022-10-26 NOTE — PROGRESS NOTES
IBD PATIENT INTAKE:    COVID symptoms in the last 7 days (runny nose, sore throat, congestion, cough, fever): No  PCP: No primary care provider on file.  If not PCP-  number given to establish [phone]: No   New PCP was added in chart     ALLERGIES REVIEWED:  Yes    CHIEF COMPLAINT:    Chief Complaint   Patient presents with    Crohn's Disease         VITAL SIGNS:  There were no vitals taken for this visit.     Change in medical, surgical, family or social history: No    IBD THERAPY (name, dose/frequency):  Stelara Q 4 weeks and MTX 15 mg Q 7 days   Last dose:  10/7/22    Next dose:  11/4/22  Infusion/Pharmacy: Other [facility]      NSAIDs (aspirin, ibuprofen-advil or motrin, naproxen-aleve, diclofenac-voltaren, BC powder, excedrin, goodies): yes, baby asa    Alternative/Complementary Medications (i.e. probiotics, turmeric, fish oil, aloe vera):      no  Name/dose:  none    Vitamins:   Vit D:  no     Vit B-12:  1000 mcg daily    Folic Acid: 1 mg daily        Calcium: no     Iron:  325 mg daily       MVI: yes    Antibiotics (past 30 Days):  no  If yes   Indication:  Name of antibiotic:  Completion date:     REVIEWED MEDICATION LIST RECONCILED INCLUDING ABOVE MEDS:  yes                  Answers submitted by the patient for this visit:  Established Patient Questionnaire  (Submitted on 10/25/2022)  abdominal pain: Yes  nausea: Yes  nervous/ anxious: Yes

## 2022-10-26 NOTE — PATIENT INSTRUCTIONS
1. Continue Stelara and methotrexate   2. Check stool calprotectin level   3. Continue to follow-up with Dr. Lovell  4. We will check with Dr. Lovell's office to get the Stelara level   5. Follow-up in 6 months

## 2022-10-27 ENCOUNTER — TELEPHONE (OUTPATIENT)
Dept: GASTROENTEROLOGY | Facility: CLINIC | Age: 57
End: 2022-10-27
Payer: COMMERCIAL

## 2022-10-27 NOTE — TELEPHONE ENCOUNTER
2nd attempt unable LVM need last stelara level (Aug) pt request stool taj order sent to office see note for contact info. Reminder set to f/u

## 2022-10-27 NOTE — TELEPHONE ENCOUNTER
----- Message from Trish Jain MA sent at 10/26/2022  3:29 PM CDT -----  Call Dr. Lovell  need [facility] level (Aug) pt request stool taj order sent to office see note for contact info.

## 2022-11-03 ENCOUNTER — TELEPHONE (OUTPATIENT)
Dept: GASTROENTEROLOGY | Facility: CLINIC | Age: 57
End: 2022-11-03
Payer: COMMERCIAL

## 2022-11-03 NOTE — TELEPHONE ENCOUNTER
3rd attempt Dr. Lovell unable LVM need last stelara level (Aug) pt request stool taj order sent to office see note for contact info. LVM for pt to contact Dr. Lovell office for Stelara results. Portal message sent. Reminder set to f/u

## 2022-11-04 ENCOUNTER — PATIENT MESSAGE (OUTPATIENT)
Dept: GASTROENTEROLOGY | Facility: CLINIC | Age: 57
End: 2022-11-04
Payer: COMMERCIAL

## 2022-11-07 ENCOUNTER — TELEPHONE (OUTPATIENT)
Dept: GASTROENTEROLOGY | Facility: CLINIC | Age: 57
End: 2022-11-07
Payer: COMMERCIAL

## 2022-11-07 NOTE — TELEPHONE ENCOUNTER
[facility]  Collected: 07/14/2022    Ustekinumab   6.1ug  Ustekinumab antibody <40ng

## 2022-11-10 ENCOUNTER — PATIENT MESSAGE (OUTPATIENT)
Dept: GASTROENTEROLOGY | Facility: CLINIC | Age: 57
End: 2022-11-10
Payer: COMMERCIAL

## 2022-11-15 ENCOUNTER — PATIENT MESSAGE (OUTPATIENT)
Dept: GASTROENTEROLOGY | Facility: CLINIC | Age: 57
End: 2022-11-15
Payer: COMMERCIAL

## 2023-01-10 ENCOUNTER — PATIENT MESSAGE (OUTPATIENT)
Dept: GASTROENTEROLOGY | Facility: CLINIC | Age: 58
End: 2023-01-10
Payer: COMMERCIAL

## 2023-04-25 ENCOUNTER — PATIENT MESSAGE (OUTPATIENT)
Dept: GASTROENTEROLOGY | Facility: CLINIC | Age: 58
End: 2023-04-25
Payer: COMMERCIAL

## 2023-04-26 ENCOUNTER — OFFICE VISIT (OUTPATIENT)
Dept: GASTROENTEROLOGY | Facility: CLINIC | Age: 58
End: 2023-04-26
Payer: COMMERCIAL

## 2023-04-26 DIAGNOSIS — K50.80 CROHN'S DISEASE OF BOTH SMALL AND LARGE INTESTINE WITHOUT COMPLICATION: Primary | ICD-10-CM

## 2023-04-26 PROCEDURE — 1160F PR REVIEW ALL MEDS BY PRESCRIBER/CLIN PHARMACIST DOCUMENTED: ICD-10-PCS | Mod: CPTII,95,, | Performed by: INTERNAL MEDICINE

## 2023-04-26 PROCEDURE — 1159F MED LIST DOCD IN RCRD: CPT | Mod: CPTII,95,, | Performed by: INTERNAL MEDICINE

## 2023-04-26 PROCEDURE — 1159F PR MEDICATION LIST DOCUMENTED IN MEDICAL RECORD: ICD-10-PCS | Mod: CPTII,95,, | Performed by: INTERNAL MEDICINE

## 2023-04-26 PROCEDURE — 1160F RVW MEDS BY RX/DR IN RCRD: CPT | Mod: CPTII,95,, | Performed by: INTERNAL MEDICINE

## 2023-04-26 PROCEDURE — 4010F PR ACE/ARB THEARPY RXD/TAKEN: ICD-10-PCS | Mod: CPTII,95,, | Performed by: INTERNAL MEDICINE

## 2023-04-26 PROCEDURE — 99213 PR OFFICE/OUTPT VISIT, EST, LEVL III, 20-29 MIN: ICD-10-PCS | Mod: 95,,, | Performed by: INTERNAL MEDICINE

## 2023-04-26 PROCEDURE — 4010F ACE/ARB THERAPY RXD/TAKEN: CPT | Mod: CPTII,95,, | Performed by: INTERNAL MEDICINE

## 2023-04-26 PROCEDURE — 99213 OFFICE O/P EST LOW 20 MIN: CPT | Mod: 95,,, | Performed by: INTERNAL MEDICINE

## 2023-04-26 NOTE — PROGRESS NOTES
IBD PATIENT INTAKE:    COVID symptoms in the last 7 days (runny nose, sore throat, congestion, cough, fever): No  PCP: Tod Salazar MD  If not PCP-  number given to establish [phone]: N/A    ALLERGIES REVIEWED:  Yes    CHIEF COMPLAINT:    Chief Complaint   Patient presents with    Crohn's Disease       VITAL SIGNS:  There were no vitals taken for this visit.     Change in medical, surgical, family or social history: No    IBD THERAPY (name, dose/frequency):  Stelara q28d MTX 15mg q7d  Last dose:  4/24    Next dose:  5/22  Infusion/Pharmacy: Other Dr. Lovell    NSAIDs (aspirin, ibuprofen-advil or motrin, naproxen-aleve, diclofenac-voltaren, BC powder, excedrin, goodies): Yes ASA 81    Alternative/Complementary Medications (i.e. probiotics, turmeric, fish oil, aloe vera):      No  Name/dose:  n/a    Vitamins:   Vit D:  no     Vit B-12:  no   Folic Acid: 1mg       Calcium: no     Iron:  325mg      MVI: yes    Antibiotics (past 30 Days):  No  If yes   Indication:  Name of antibiotic:  Completion date:     REVIEWED MEDICATION LIST RECONCILED INCLUDING ABOVE MEDS:  yes

## 2023-04-26 NOTE — PROGRESS NOTES
[facility]          Inflammatory Bowel Disease Follow Up Note         TODAY'S VISIT DATE:  4/26/2023    Reason for Consult:    Chief Complaint   Patient presents with    Crohn's Disease       PCP: No primary care provider on file.      Referring MD:   Dr. Osiel Lovell    History of Present Illness:  Tyler Ordoñez III who is a 57 y.o. male is being seen today at the [facility] on 04/26/2023 for inflammatory bowel disease- Crohn's disease.  He is continuing to do very well.  He does notice a slight increase in abdominal cramping 3 or 4 days before his next injection but for the most part he feels like his symptoms are significantly improved.  He occasionally has a little constipation issues and has noticed that increasing his water intake helped significantly.  He underwent a colonoscopy on March 31, 2023 that showed no evidence of active disease.  He denies any side effects from his medications.    IBD History:  In March of 2013 he received antibiotics for a tooth infection.  He was treated with amoxicillin at that time.  Several weeks later he developed severe abdominal pain with cramping and diarrhea.  He was empirically treated for C diff colitis with metronidazole but there was no improvement.  He underwent a colonoscopy that showed patchy colitis in the rectum and sigmoid colon but a normal terminal ileum.  Biopsy showed cryptitis but were otherwise unremarkable.  He was initially started on budesonide but had a severe allergic reaction with swelling of his mouth and lips so this was discontinued.  He was then treated with Rowasa enemas for a short period of time as well as a probiotic.  His diarrhea completely resolved.  He did well until early 2014 when he began having abdominal cramping and diarrhea again.  He also had some hematochezia at that time.  A colonoscopy was done which showed rectal sparing but severe colitis  involving the sigmoid colon and progressing proximally.  He also had some ileitis as well and it was felt that this picture was consistent with Crohn's disease.  He was started on prednisone 40 milligrams daily and metronidazole.  He was also started on azathioprine.  In April of 2014 he was admitted to the hospital with severe diarrhea and found to be positive for C diff colitis.  He was treated with 2 weeks of vancomycin and his diarrhea resolved.  Shortly after this hospitalization he was hospitalized again and diagnosed with pancreatitis that was felt to be related to azathioprine.  He was subsequently started on infliximab.  Shortly after starting infliximab he developed hives while receiving infliximab.  This required treatment with Benadryl.  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  Infliximab was stopped and he was started on adalimumab in June 2014. In spite of adalimumab he continued to have ongoing symptoms.  Repeat testing for C diff was negative.  He was started on higher dose of prednisone and had some improvement in his diarrhea and abdominal cramping.  A repeat colonoscopy October 31, 2014 showed continued ulceration of the rectosigmoid colon.  A capsule endoscopy was also performed that showed no small intestinal lesions.  In February of 2015 he had a normal small bowel series.  He was started on vedolizumab in December of 2014 along with methotrexate 25 milligrams weekly subcutaneous injections.  In April of 2015 he was seen at our clinic by Dr. Fried.  A subsequent colonoscopy done May 1, 2015 showed multiple small ulcers throughout the entire colon again.  He also had 2 small colon polyps as well.  The pathology from this procedure was unavailable.  He continued to do fairly well from a symptom standpoint on vedolizumab and methotrexate.  A repeat colonoscopy was done on June 14, 2018 and was normal appearing.  Biopsies from the terminal ileum were  normal as well.  A repeat small-bowel follow-through in August 2018 was also normal.  He complained of abdominal cramping as well as chronic constipation issues.  He was taking Norco daily due to chronic abdominal pain.  Methotrexate was switched to 15 milligrams weekly taken orally as opposed to injected in 2018. His constipation was managed with Linzess 145 micrograms daily but he continued to take Norco every night.  He was noted to have some symptoms about 1-2 weeks before his next infusions so vedolizumab levels were checked in 2019 and were noted to be 11 with no antibodies present.  In early 2020 his Entyvio infusions were increased to every 6 week intervals and he was feeling much better.  He continued to take methotrexate 15 milligrams weekly and folic acid.  A repeat vedolizumab level was 24 with no antibodies present January 13, 2021. At that time he also had a negative TB test and negative hepatitis-B serologies.  A CRP was also undetectable.  Due to an increase in hematochezia in early 2021 he had a repeat colonoscopy in March 2021. At that time he was found to have mild diverticulosis throughout the entire colon as well as the diffuse patch of abnormal mucosa in the rectum and the sigmoid colon characterized by erythema, edema, and ulceration.  Biopsy showed chronic moderately active colitis again.  At that time his vedolizumab level was 31. He started Stelara in January 2021 and was initially feeling well but by August of 2021 he was having an increase in symptoms again.  Stool studies were repeated and were negative for infection but a fecal calprotectin level was markedly elevated at greater than 3000. A repeat colonoscopy in September 2021 so showed diffuse inflammation and ulceration throughout the rectum and friable mucosa throughout the rest of the colon.  Biopsies again showed chronic active proctitis.  A CT abdomen pelvis done September 9, 2021 showed findings consistent with descending and  sigmoid colitis including thickening and fat stranding.  He was eventually started on prednisone 40 milligrams daily as well.  A Stelara level was ordered for September but never was collected.  The decision was made to increase his dose to every 4 weeks at the end of September.  He was referred to the IBD the clinic for further evaluation.  A recent Stelara level was 4.3 with no antibodies present.  After our initial visit he started methotrexate in addition to Stelara every month.  He was able to wean off of prednisone.  A follow-up stool calprotectin in February 2022 had declined significantly to 695.  He continues to do very well.  He finally underwent a follow-up colonoscopy in March 2023 that showed no evidence of active disease endoscopically or histologically.    IBD Details:  Dx Date:  2013  Disease type/distribution:  Crohn's disease/rectum, sigmoid, descending colon recently but previous colonoscopy with evidence of active disease into the ascending colon and transverse colon; mild ileitis noted in 1 colonoscopy  Current Treatment:  Stelara 90 mg every 4 weeks/methotrexate 15 mg weekly  Start Date:  January 2021/December 2021 Response:  Incomplete  Optimized:  Yes  Adverse reactions:  None  Prior surgeries:  None  CRP Elevation: Y  calprotectin:  Elevated with active disease  Disease Complications:  None  Extraintestinal manifestations:  None  Prior treatments:   Steroids:  Good response to prednisone-significant side effects; budesonide caused hives years ago but he has used recently without any issues  5ASA:  None  IMM:  Pancreatitis caused by azathioprine, no issues with methotrexate  TNF Inh:  Remicade-only received 1 or 2 doses before having an adverse reaction (reports of hives and a syncopal episode)    Adalimumab-took for 6 months, no drug levels checked, no adverse reactions, ineffective, dose never increased   Anti-Integrin:  Secondary nonresponder to optimized Entyvio dosing   [place] 12/23:   Currently on Stelara every 4 weeks with a level of 4.3  BRAN Inh:  None    Previous Clinical Trials:  None    Last Colonoscopy:   March 31, 2023-no evidence of active disease in the colon or terminal ileum endoscopically or on biopsy  September 2021-active disease in the rectum, sigmoid, descending colon    Other Endoscopies:  Previous upper endoscopy reviewed    Imaging:   MRE:  None   CT:  Inflammatory changes in the sigmoid and descending colon in September 2021   Other:  Multiple small-bowel follow-through was reviewed    Pertinent Labs:  Lab Results   Component Value Date    CRP 4.6 04/29/2015     No results found for: TTGIGA, IGA  No results found for: TSH, FREET4  Lab Results   Component Value Date    RFLKLJCF89LX 62 04/29/2015    DBEBOTVT93 824 04/29/2015     No results found for: HEPBSAG, HEPBCAB, HEPCAB  No results found for: ESU74XNFL  No results found for: NIL, TBAG, TBAGNIL, MITOGENNIL, TBGOLD, TSPOTSCREN  No results found for: TPTMINTERP, TPMTRESULT  No results found for: STOOLCULTURE, VCQXVGHHSB9N, EUNDZKUYOG4E, CDIFFICILEAN, CDIFFTOX, CDIFFICILEBY  No results found for: CALPROTECTIN    Therapeutic Drug Monitoring Labs:  No results found for: PROMETH  No results found for: ANSADAINIT, INFLIXIMAB, INFLIXINTERP    Vaccinations:  No results found for: HEPBSAB  No results found for: HEPAIGG  No results found for: VARICELLAZOS, VARICELLAINT  Immunization History   Administered Date(s) Administered    Influenza 09/15/2021         Review of Systems  Review of Systems   Constitutional:  Negative for chills, fever and weight loss.   HENT:  Negative for sore throat.    Eyes:  Negative for pain, discharge and redness.   Respiratory:  Negative for cough, shortness of breath and wheezing.    Cardiovascular:  Negative for chest pain and leg swelling.   Gastrointestinal:  Negative for abdominal pain, blood in stool, constipation, diarrhea, heartburn, melena, nausea and vomiting.   Genitourinary:  Negative for dysuria  and frequency.   Musculoskeletal:  Negative for back pain, joint pain and myalgias.   Skin:  Negative for rash.   Neurological:  Negative for focal weakness and seizures.   Endo/Heme/Allergies:  Does not bruise/bleed easily.   Psychiatric/Behavioral:  Negative for depression. The patient is not nervous/anxious.      Medical History:   Past Medical History:   Diagnosis Date    Crohn's disease     UC on IBD serology    DM2 (diabetes mellitus, type 2)     Thyroiditis        Surgical History:  Past Surgical History:   Procedure Laterality Date    UNDESCENDED TESTICLE EXPLORATION         Family History:   Family History   Problem Relation Age of Onset    Inflammatory bowel disease Father         possibly    Colon cancer Neg Hx     Celiac disease Neg Hx        Social History:   Social History     Tobacco Use    Smoking status: Never    Smokeless tobacco: Never   Substance Use Topics    Drug use: Never       Allergies: Reviewed    Home Medications:   Medication List with Changes/Refills   Current Medications    AMITRIPTYLINE (ELAVIL) 25 MG TABLET    Take 1 tablet (25 mg total) by mouth every evening. Take 1/2 tablet each night first week, then increase to 1 tablet each night    ASPIRIN (ECOTRIN) 81 MG EC TABLET    Take 81 mg by mouth once daily.    FERROUS SULFATE (FEOSOL) 325 MG (65 MG IRON) TAB TABLET        FISH OIL-OMEGA-3 FATTY ACIDS 300-1,000 MG CAPSULE    Take 2 g by mouth once daily.    FOLIC ACID (FOLVITE) 1 MG TABLET    Take 1,000 mcg by mouth once daily.    HUMALOG KWIKPEN INSULIN 100 UNIT/ML PEN    Inject into the skin.    HYDROCODONE-ACETAMINOPHEN 7.5-325MG (NORCO) 7.5-325 MG PER TABLET    Take 1 tablet by mouth daily as needed for Pain.    INSULIN ASPART (NOVOLOG) 100 UNIT/ML INJECTION    Inject into the skin 3 (three) times daily before meals.    JARDIANCE 25 MG TABLET    Take 25 mg by mouth once daily.    LEVEMIR FLEXTOUCH U-100 INSULN 100 UNIT/ML (3 ML) INPN PEN    Inject into the skin.    LOSARTAN  (COZAAR) 25 MG TABLET    Take 25 mg by mouth once daily.    MECOBALAMIN (B12 ACTIVE ORAL)    once daily at 6am.    MEN'S MULTI-VITAMIN ORAL    Take by mouth.    METFORMIN (GLUCOPHAGE) 1000 MG TABLET    Take 1,000 mg by mouth 2 (two) times daily.    METHOTREXATE 2.5 MG TAB    Take 15 mg by mouth every 7 days.    ONDANSETRON (ZOFRAN) 8 MG TABLET    Take 8 mg by mouth every 8 (eight) hours.    SIMVASTATIN (ZOCOR) 40 MG TABLET    Take 40 mg by mouth every evening.    STELARA 90 MG/ML SYRG SYRINGE    Inject into the skin every 28 days.       Physical Exam:  Vital Signs:  There were no vitals taken for this visit.  There is no height or weight on file to calculate BMI.    Physical Exam  Nursing note reviewed.   Constitutional:       General: He is not in acute distress.     Appearance: Normal appearance. He is well-developed. He is not ill-appearing or toxic-appearing.   Skin:     Findings: No rash.   Neurological:      General: No focal deficit present.      Mental Status: He is alert and oriented to person, place, and time.   Psychiatric:         Mood and Affect: Mood normal.         Behavior: Behavior normal.         Thought Content: Thought content normal.         Judgment: Judgment normal.       Labs: reviewed and pertinent noted above    Assessment/Plan:  Tyler Ordoñez III is a 57 y.o. male with longstanding complicated Crohn's disease that has been fairly refractory to most medical therapies. The following issues were addresssed:    1. Crohn's disease of both small and large intestine without complication      1. Crohn's disease:  He continues to do very well.  His recent colonoscopy showed endoscopic and histologic remission.  Today I recommend that he continue his current medical therapy.  Given the fact that his Crohn's disease is under excellent control he will follow-up with his regular gastroenterologist and does not need to follow-up with us unless something new develops.  Continue checking CMP and CBC  every 3 months due to methotrexate use.  Check TB test and hepatitis-B serologies annually.  Colonoscopy every 2 years or sooner if symptoms dictate.     Ex smoker:  - recommended to continue smoking cessation  - discussed in detail that Crohn's disease can worsen with smoking and may make patient more resistant to treatment    # IBD specific health maintenance:  Colon cancer surveillance:  Up-to-date    Annual:  - Eye exam:  Not applicable  - Skin exam (if on IMM/TNF):  Discuss in the future  - reminded pt to use sunblock/hats/sunprotective clothing  - PAP (if immunosuppressed):  Not applicable    DEXA:  Will need a bone scan in the near future if not done recently.    Vitamin D:  Per primary gastroenterologist    Vaccines:    Influenza:  Recommend annual vaccination   COVID:  Completed series and booster    Follow up: Follow up if symptoms worsen or fail to improve.    Thank you again for sending Tyler Ordoñez III to see Dr. Timbo Sawyer today at the [facility]. Please don't hesitate to contact Dr. Sawyer if there are any questions regarding this evaluation, or if you have any other patients with inflammatory bowel disease for whom you would like a consultation. You can reach Dr. Sawyer at [phone] or by email at [email or web address]    Brannon Sawyer MD  Department of Gastroenterology  Inflammatory Bowel Disease    The patient location is: [place]  The chief complaint leading to consultation is: Crohn's disease    Visit type: audiovisual    Face to Face time with patient: 10  15 minutes of total time spent on the encounter, which includes face to face time and non-face to face time preparing to see the patient (eg, review of tests), Obtaining and/or reviewing separately obtained history, Documenting clinical information in the electronic or other health record, Independently interpreting results (not separately reported) and communicating results to the  patient/family/caregiver, or Care coordination (not separately reported).         Each patient to whom he or she provides medical services by telemedicine is:  (1) informed of the relationship between the physician and patient and the respective role of any other health care provider with respect to management of the patient; and (2) notified that he or she may decline to receive medical services by telemedicine and may withdraw from such care at any time.    Notes:
